# Patient Record
Sex: FEMALE | Race: WHITE | Employment: PART TIME | URBAN - METROPOLITAN AREA
[De-identification: names, ages, dates, MRNs, and addresses within clinical notes are randomized per-mention and may not be internally consistent; named-entity substitution may affect disease eponyms.]

---

## 2017-02-14 ENCOUNTER — COMMUNICATION - HEALTHEAST (OUTPATIENT)
Dept: CARDIOLOGY | Facility: HOSPITAL | Age: 47
End: 2017-02-14

## 2018-04-27 ENCOUNTER — RECORDS - HEALTHEAST (OUTPATIENT)
Dept: ADMINISTRATIVE | Facility: OTHER | Age: 48
End: 2018-04-27

## 2019-02-09 ENCOUNTER — OFFICE VISIT (OUTPATIENT)
Dept: URGENT CARE | Facility: URGENT CARE | Age: 49
End: 2019-02-09
Payer: MEDICAID

## 2019-02-09 VITALS
TEMPERATURE: 97.8 F | OXYGEN SATURATION: 94 % | HEART RATE: 93 BPM | SYSTOLIC BLOOD PRESSURE: 108 MMHG | WEIGHT: 242 LBS | RESPIRATION RATE: 20 BRPM | DIASTOLIC BLOOD PRESSURE: 60 MMHG

## 2019-02-09 DIAGNOSIS — J02.0 STREP PHARYNGITIS: Primary | ICD-10-CM

## 2019-02-09 DIAGNOSIS — J45.41 MODERATE PERSISTENT ASTHMA WITH EXACERBATION: ICD-10-CM

## 2019-02-09 LAB
DEPRECATED S PYO AG THROAT QL EIA: ABNORMAL
FLUAV+FLUBV AG SPEC QL: NEGATIVE
FLUAV+FLUBV AG SPEC QL: NEGATIVE
SPECIMEN SOURCE: ABNORMAL
SPECIMEN SOURCE: NORMAL

## 2019-02-09 PROCEDURE — 87880 STREP A ASSAY W/OPTIC: CPT | Performed by: FAMILY MEDICINE

## 2019-02-09 PROCEDURE — 99214 OFFICE O/P EST MOD 30 MIN: CPT | Performed by: FAMILY MEDICINE

## 2019-02-09 PROCEDURE — 87804 INFLUENZA ASSAY W/OPTIC: CPT | Performed by: FAMILY MEDICINE

## 2019-02-09 RX ORDER — AMLODIPINE BESYLATE 10 MG/1
10 TABLET ORAL
COMMUNITY
Start: 2018-02-07

## 2019-02-09 RX ORDER — PANTOPRAZOLE SODIUM 40 MG/1
40 TABLET, DELAYED RELEASE ORAL
COMMUNITY
Start: 2018-07-27

## 2019-02-09 RX ORDER — NYSTATIN 100000 U/G
CREAM TOPICAL
COMMUNITY
Start: 2017-12-13

## 2019-02-09 RX ORDER — IPRATROPIUM BROMIDE AND ALBUTEROL SULFATE 2.5; .5 MG/3ML; MG/3ML
3 SOLUTION RESPIRATORY (INHALATION)
COMMUNITY

## 2019-02-09 RX ORDER — PENICILLIN V POTASSIUM 500 MG/1
500 TABLET, FILM COATED ORAL 2 TIMES DAILY
Qty: 20 TABLET | Refills: 0 | Status: SHIPPED | OUTPATIENT
Start: 2019-02-09 | End: 2019-02-19

## 2019-02-09 RX ORDER — VENLAFAXINE HYDROCHLORIDE 75 MG/1
75 CAPSULE, EXTENDED RELEASE ORAL
COMMUNITY
Start: 2018-05-04

## 2019-02-09 RX ORDER — ALBUTEROL SULFATE 90 UG/1
1-2 AEROSOL, METERED RESPIRATORY (INHALATION)
COMMUNITY
Start: 2018-02-07

## 2019-02-09 RX ORDER — LISINOPRIL 20 MG/1
20 TABLET ORAL
COMMUNITY
Start: 2018-02-07

## 2019-02-09 RX ORDER — BUSPIRONE HYDROCHLORIDE 10 MG/1
10 TABLET ORAL
COMMUNITY
Start: 2018-02-07

## 2019-02-09 RX ORDER — PREDNISONE 20 MG/1
TABLET ORAL
Qty: 10 TABLET | Refills: 0 | Status: SHIPPED | OUTPATIENT
Start: 2019-02-09

## 2019-02-09 RX ORDER — IMIPRAMINE HYDROCHLORIDE 25 MG/1
TABLET ORAL
COMMUNITY
Start: 2015-01-18

## 2019-02-09 NOTE — PROGRESS NOTES
SUBJECTIVE:   Naomi Jeff is a 48 year old female who presents to clinic today for the following health issues:    HPI     Presents with boyfriend with hx of ST, cough, bodyaches since W.  Just got vaccinated against flu.  Roommate sick.  Wants to be checked for flu and strep.  Increased wheezing and harsh cough. + asthma.    Problem list and histories reviewed & adjusted, as indicated.  Additional history: as documented        There is no problem list on file for this patient.    No past surgical history on file.    Social History     Tobacco Use     Smoking status: Never Smoker     Smokeless tobacco: Never Used   Substance Use Topics     Alcohol use: Not on file     No family history on file.      Current Outpatient Medications   Medication Sig Dispense Refill     albuterol (VENTOLIN HFA) 108 (90 Base) MCG/ACT inhaler Inhale 1-2 puffs into the lungs       amLODIPine (NORVASC) 10 MG tablet Take 10 mg by mouth       busPIRone (BUSPAR) 10 MG tablet Take 10 mg by mouth       fluticasone (ARNUITY ELLIPTA) 100 MCG/ACT inhaler Inhale 1 puff into the lungs       levonorgestrel (MIRENA) 20 MCG/24HR IUD 1 Device by Intrauterine route       lisinopril (PRINIVIL/ZESTRIL) 20 MG tablet Take 20 mg by mouth       nebulizer (ADULT) device/mask kit Neb, 4 disposable neb kit, 1 reuseable neb kit, mask, filters x 1.   Frequency of use: daily as needed. Length of need: 99 months       nystatin (MYCOSTATIN) 354484 UNIT/GM external cream        pantoprazole (PROTONIX) 40 MG EC tablet Take 40 mg by mouth       penicillin V (VEETID) 500 MG tablet Take 1 tablet (500 mg) by mouth 2 times daily for 10 days 20 tablet 0     predniSONE (DELTASONE) 20 MG tablet 2 tabs once daily x 5 days. 10 tablet 0     ranitidine (ZANTAC) 300 MG tablet Take 1/2-1 tablet daily PRN       Spacer/Aero-Holding Chambers (AEROCHAMBER WITH MOUTHPIECE) MISC For home use.       venlafaxine (EFFEXOR-XR) 75 MG 24 hr capsule Take 75 mg by mouth       ipratropium -  albuterol 0.5 mg/2.5 mg/3 mL (DUONEB) 0.5-2.5 (3) MG/3ML neb solution 3 mLs       Allergies   Allergen Reactions     Hydrochlorothiazide Rash     No lab results found.   BP Readings from Last 3 Encounters:   02/09/19 108/60    Wt Readings from Last 3 Encounters:   02/09/19 109.8 kg (242 lb)         ROS:  Constitutional, HEENT, cardiovascular, pulmonary, gi and gu systems are negative, except as otherwise noted.    OBJECTIVE:     /60 (BP Location: Right arm)   Pulse 93   Temp 97.8  F (36.6  C) (Tympanic)   Resp 20   Wt 109.8 kg (242 lb)   SpO2 94%   There is no height or weight on file to calculate BMI.  GENERAL: healthy, alert and no distress  EYES: Eyes grossly normal to inspection, PERRL and conjunctivae and sclerae normal  HENT: ear canals and TM's normal, nose and mouth without ulcers or lesions, mild erythema no exudate of posterior pharynx.  NECK: no adenopathy, no asymmetry, masses, or scars and thyroid normal to palpation  RESP: lungs clear to auscultation - no rales or rhonchi, scattered wheezes throughout with harsh congested cough  CV: regular rate and rhythm, normal S1 S2, no S3 or S4, no murmur, click or rub, no peripheral edema and peripheral pulses strong  ABDOMEN: soft, nontender, no hepatosplenomegaly, no masses and bowel sounds normal  MS: no gross musculoskeletal defects noted, no edema  SKIN: no suspicious lesions or rashes  PSYCH: mentation appears normal, affect normal/bright    Diagnostic Test Results:  Results for orders placed or performed in visit on 02/09/19 (from the past 24 hour(s))   Strep, Rapid Screen   Result Value Ref Range    Specimen Description Throat     Rapid Strep A Screen (A)      POSITIVE: Group A Streptococcal antigen detected by immunoassay.   Influenza A/B antigen   Result Value Ref Range    Influenza A/B Agn Specimen Nasal     Influenza A Negative NEG^Negative    Influenza B Negative NEG^Negative       ASSESSMENT/PLAN:     1. Strep pharyngitis    - Strep,  Rapid Screen  - Influenza A/B antigen  - penicillin V (VEETID) 500 MG tablet; Take 1 tablet (500 mg) by mouth 2 times daily for 10 days  Dispense: 20 tablet; Refill: 0    Treat with PenVK bid x 10 days.  Continue with rest and fluids.    2. Moderate persistent asthma with exacerbation  - predniSONE (DELTASONE) 20 MG tablet; 2 tabs once daily x 5 days.  Dispense: 10 tablet; Refill: 0    Prednisone burst with continued inhaler regimen.  Close Follow-up if no change or worsening symptoms.    Katerina Zavaleta MD  [facility]

## 2019-02-15 ENCOUNTER — HEALTH MAINTENANCE LETTER (OUTPATIENT)
Age: 49
End: 2019-02-15

## 2020-03-11 ENCOUNTER — HEALTH MAINTENANCE LETTER (OUTPATIENT)
Age: 50
End: 2020-03-11

## 2021-01-03 ENCOUNTER — HEALTH MAINTENANCE LETTER (OUTPATIENT)
Age: 51
End: 2021-01-03

## 2021-04-25 ENCOUNTER — HEALTH MAINTENANCE LETTER (OUTPATIENT)
Age: 51
End: 2021-04-25

## 2021-05-28 ENCOUNTER — RECORDS - HEALTHEAST (OUTPATIENT)
Dept: ADMINISTRATIVE | Facility: CLINIC | Age: 51
End: 2021-05-28

## 2021-05-29 ENCOUNTER — RECORDS - HEALTHEAST (OUTPATIENT)
Dept: ADMINISTRATIVE | Facility: CLINIC | Age: 51
End: 2021-05-29

## 2021-10-10 ENCOUNTER — HEALTH MAINTENANCE LETTER (OUTPATIENT)
Age: 51
End: 2021-10-10

## 2022-01-29 ENCOUNTER — HEALTH MAINTENANCE LETTER (OUTPATIENT)
Age: 52
End: 2022-01-29

## 2022-03-23 ENCOUNTER — HOSPITAL ENCOUNTER (EMERGENCY)
Facility: CLINIC | Age: 52
Discharge: HOME | End: 2022-03-23
Admitting: PHYSICIAN ASSISTANT
Payer: COMMERCIAL

## 2022-03-23 VITALS
HEART RATE: 82 BPM | DIASTOLIC BLOOD PRESSURE: 104 MMHG | RESPIRATION RATE: 16 BRPM | OXYGEN SATURATION: 100 % | SYSTOLIC BLOOD PRESSURE: 169 MMHG | TEMPERATURE: 97.7 F | HEIGHT: 62 IN | BODY MASS INDEX: 38.09 KG/M2 | WEIGHT: 207 LBS

## 2022-03-23 DIAGNOSIS — S61.216A LACERATION OF RIGHT LITTLE FINGER WITHOUT FOREIGN BODY WITHOUT DAMAGE TO NAIL, INITIAL ENCOUNTER: ICD-10-CM

## 2022-03-23 PROCEDURE — 99283 EMERGENCY DEPT VISIT LOW MDM: CPT

## 2022-03-23 PROCEDURE — 272N000047 HC ADHESIVE DERMABOND SKIN

## 2022-03-23 PROCEDURE — 12001 RPR S/N/AX/GEN/TRNK 2.5CM/<: CPT

## 2022-03-23 NOTE — ED PROVIDER NOTES
EMERGENCY DEPARTMENT ENCOUNTER      NAME: Naomi Jeff  AGE: 51 year old female  YOB: 1970  MRN: [id number]  EVALUATION DATE & TIME: 3/23/2022  6:15 PM    PCP: Matthew Rivas [place]    ED PROVIDER: Venkat Willis PA-C      Chief Complaint   Patient presents with     Laceration         FINAL IMPRESSION:  1. Laceration of right little finger without foreign body without damage to nail, initial encounter          MEDICAL DECISION MAKING:    Pertinent Labs & Imaging studies reviewed. (See chart for details)  51 year old female presents to the Emergency Department for evaluation of fingertip laceration involving right fifth fingertip.    After obtaining history of present illness, reviewing vitals and examining the patient we did clean the wound and sealed up using Dermabond.  There was completely missing piece of evulsed tissue that was not able to be reattached.  The wound itself will remodel and heal well.  I informed the patient that she needs to keep the area clean and dry for at least a week, specifically not soaking the area in water.  Patient's last tetanus was in 2020 no need for update.  The skin glue will wear off on its own.      ED COURSE    I met with the patient, obtained history, performed an initial exam, and discussed options and plan for diagnostics and treatment here in the ED.    At the conclusion of the encounter I discussed the results of all of the tests and the disposition. The questions were answered. The patient or family acknowledged understanding and was agreeable with the care plan.     MEDICATIONS GIVEN IN THE EMERGENCY:  Medications - No data to display    NEW PRESCRIPTIONS STARTED AT TODAY'S ER VISIT  New Prescriptions    No medications on file            =================================================================    HPI    Patient information was obtained from:   Naomi Jeff is a 51 year old female who presents to this ED for evaluation of  right fifth fingertip laceration.  Patient reports that she was using a mandolin to cut zucchini.  While using this she sliced the tip of her right fifth fingertip off.  Difficulty with bleeding control.  No use of blood thinners.  No other injuries reported.  Last tetanus 2020.            PAST MEDICAL HISTORY:  No past medical history on file.    PAST SURGICAL HISTORY:  No past surgical history on file.      CURRENT MEDICATIONS:    No current facility-administered medications for this encounter.    Current Outpatient Medications:      albuterol (VENTOLIN HFA) 108 (90 Base) MCG/ACT inhaler, Inhale 1-2 puffs into the lungs, Disp: , Rfl:      amLODIPine (NORVASC) 10 MG tablet, Take 10 mg by mouth, Disp: , Rfl:      busPIRone (BUSPAR) 10 MG tablet, Take 10 mg by mouth, Disp: , Rfl:      fluticasone (ARNUITY ELLIPTA) 100 MCG/ACT inhaler, Inhale 1 puff into the lungs, Disp: , Rfl:      ipratropium - albuterol 0.5 mg/2.5 mg/3 mL (DUONEB) 0.5-2.5 (3) MG/3ML neb solution, 3 mLs, Disp: , Rfl:      levonorgestrel (MIRENA) 20 MCG/24HR IUD, 1 Device by Intrauterine route, Disp: , Rfl:      lisinopril (PRINIVIL/ZESTRIL) 20 MG tablet, Take 20 mg by mouth, Disp: , Rfl:      nebulizer (ADULT) device/mask kit, Neb, 4 disposable neb kit, 1 reuseable neb kit, mask, filters x 1.   Frequency of use: daily as needed. Length of need: 99 months, Disp: , Rfl:      nystatin (MYCOSTATIN) 004631 UNIT/GM external cream, , Disp: , Rfl:      pantoprazole (PROTONIX) 40 MG EC tablet, Take 40 mg by mouth, Disp: , Rfl:      predniSONE (DELTASONE) 20 MG tablet, 2 tabs once daily x 5 days., Disp: 10 tablet, Rfl: 0     ranitidine (ZANTAC) 300 MG tablet, Take 1/2-1 tablet daily PRN, Disp: , Rfl:      Spacer/Aero-Holding Chambers (AEROCHAMBER WITH MOUTHPIECE) MISC, For home use., Disp: , Rfl:      venlafaxine (EFFEXOR-XR) 75 MG 24 hr capsule, Take 75 mg by mouth, Disp: , Rfl:       ALLERGIES:  Allergies   Allergen Reactions     Hydrochlorothiazide Rash  "      FAMILY HISTORY:  No family history on file.    SOCIAL HISTORY:   Social History     Socioeconomic History     Marital status:      Spouse name: Not on file     Number of children: Not on file     Years of education: Not on file     Highest education level: Not on file   Occupational History     Not on file   Tobacco Use     Smoking status: Never Smoker     Smokeless tobacco: Never Used   Substance and Sexual Activity     Alcohol use: Not on file     Drug use: Not on file     Sexual activity: Not on file   Other Topics Concern     Not on file   Social History Narrative     Not on file     Social Determinants of Health     Financial Resource Strain: Not on file   Food Insecurity: Not on file   Transportation Needs: Not on file   Physical Activity: Not on file   Stress: Not on file   Social Connections: Not on file   Intimate Partner Violence: Not on file   Housing Stability: Not on file       VITALS:  Patient Vitals for the past 24 hrs:   BP Temp Pulse Resp SpO2 Height Weight   03/23/22 1757 (!) 169/104 97.7  F (36.5  C) 82 16 100 % 1.575 m (5' 2\") 93.9 kg (207 lb)       PHYSICAL EXAM    Physical Exam  Vitals and nursing note reviewed.   Constitutional:       General: She is not in acute distress.     Appearance: She is normal weight. She is not toxic-appearing.   HENT:      Head: Normocephalic.   Eyes:      Conjunctiva/sclera: Conjunctivae normal.   Pulmonary:      Effort: Pulmonary effort is normal. No respiratory distress.   Musculoskeletal:         General: No deformity or signs of injury. Normal range of motion.   Skin:     General: Skin is warm.      Comments: Avulsion type injury involving right fifth fingertip.  1 cm area of tissue completely removed.  Venous oozing and bleeding noted.  No nailbed involvement.   Neurological:      General: No focal deficit present.      Mental Status: She is alert. Mental status is at baseline.      Sensory: No sensory deficit.      Motor: No weakness. "   Psychiatric:         Mood and Affect: Mood normal.            LAB:  All pertinent labs reviewed and interpreted.       RADIOLOGY:  Reviewed all pertinent imaging. Please see official radiology report.  No orders to display     Procedure:  [facility]    -Laceration Repair    Date/Time: 3/23/2022 6:47 PM  Performed by: Venkat Willis PA-C  Authorized by: Venkat Willis PA-C     Risks, benefits and alternatives discussed.    LACERATION DETAILS     Location:  Finger    Finger location:  R small finger    Length (cm):  1    Depth (mm):  3    REPAIR TYPE:     Repair type:  Simple      EXPLORATION:     Hemostasis achieved with:  Direct pressure and tourniquet    Wound extent: no tendon damage and no underlying fracture      TREATMENT:     Area cleansed with:  Saline    Amount of cleaning:  Standard    SKIN REPAIR     Repair method:  Tissue adhesive    POST-PROCEDURE DETAILS     Dressing:  Adhesive bandage            Venkat Willis PA-C  Emergency Medicine  [facility]     Venkat Willis PA-C  03/23/22 4261

## 2022-05-21 ENCOUNTER — HEALTH MAINTENANCE LETTER (OUTPATIENT)
Age: 52
End: 2022-05-21

## 2022-09-18 ENCOUNTER — HEALTH MAINTENANCE LETTER (OUTPATIENT)
Age: 52
End: 2022-09-18

## 2023-01-29 ENCOUNTER — HEALTH MAINTENANCE LETTER (OUTPATIENT)
Age: 53
End: 2023-01-29

## 2023-05-07 ENCOUNTER — HEALTH MAINTENANCE LETTER (OUTPATIENT)
Age: 53
End: 2023-05-07

## 2024-02-25 ENCOUNTER — HEALTH MAINTENANCE LETTER (OUTPATIENT)
Age: 54
End: 2024-02-25

## 2025-02-23 ENCOUNTER — APPOINTMENT (OUTPATIENT)
Dept: CT IMAGING | Facility: CLINIC | Age: 55
End: 2025-02-23
Attending: EMERGENCY MEDICINE
Payer: COMMERCIAL

## 2025-02-23 ENCOUNTER — HOSPITAL ENCOUNTER (EMERGENCY)
Facility: CLINIC | Age: 55
Discharge: HOME | End: 2025-02-23
Attending: EMERGENCY MEDICINE | Admitting: EMERGENCY MEDICINE
Payer: COMMERCIAL

## 2025-02-23 VITALS
HEIGHT: 62 IN | HEART RATE: 84 BPM | BODY MASS INDEX: 33.49 KG/M2 | TEMPERATURE: 98.9 F | RESPIRATION RATE: 17 BRPM | WEIGHT: 182 LBS | DIASTOLIC BLOOD PRESSURE: 95 MMHG | OXYGEN SATURATION: 97 % | SYSTOLIC BLOOD PRESSURE: 170 MMHG

## 2025-02-23 DIAGNOSIS — N10 PYELONEPHRITIS, ACUTE: ICD-10-CM

## 2025-02-23 LAB
ALBUMIN SERPL BCG-MCNC: 4.2 G/DL (ref 3.5–5.2)
ALBUMIN UR-MCNC: 50 MG/DL
ALP SERPL-CCNC: 118 U/L (ref 40–150)
ALT SERPL W P-5'-P-CCNC: 26 U/L (ref 0–50)
ANION GAP SERPL CALCULATED.3IONS-SCNC: 13 MMOL/L (ref 7–15)
APPEARANCE UR: ABNORMAL
AST SERPL W P-5'-P-CCNC: 25 U/L (ref 0–45)
BACTERIA #/AREA URNS HPF: ABNORMAL /HPF
BASOPHILS # BLD AUTO: 0.1 10E3/UL (ref 0–0.2)
BASOPHILS NFR BLD AUTO: 0 %
BILIRUB SERPL-MCNC: 0.6 MG/DL
BILIRUB UR QL STRIP: NEGATIVE
BUN SERPL-MCNC: 5.7 MG/DL (ref 6–20)
CALCIUM SERPL-MCNC: 9.2 MG/DL (ref 8.8–10.4)
CHLORIDE SERPL-SCNC: 99 MMOL/L (ref 98–107)
COLOR UR AUTO: ABNORMAL
CREAT SERPL-MCNC: 0.68 MG/DL (ref 0.51–0.95)
CRP SERPL-MCNC: 25.5 MG/L
EGFRCR SERPLBLD CKD-EPI 2021: >90 ML/MIN/1.73M2
EOSINOPHIL # BLD AUTO: 0.1 10E3/UL (ref 0–0.7)
EOSINOPHIL NFR BLD AUTO: 1 %
ERYTHROCYTE [DISTWIDTH] IN BLOOD BY AUTOMATED COUNT: 12.9 % (ref 10–15)
GLUCOSE SERPL-MCNC: 102 MG/DL (ref 70–99)
GLUCOSE UR STRIP-MCNC: NEGATIVE MG/DL
HCO3 SERPL-SCNC: 24 MMOL/L (ref 22–29)
HCT VFR BLD AUTO: 38.6 % (ref 35–47)
HGB BLD-MCNC: 14 G/DL (ref 11.7–15.7)
HGB UR QL STRIP: ABNORMAL
IMM GRANULOCYTES # BLD: 0.1 10E3/UL
IMM GRANULOCYTES NFR BLD: 0 %
KETONES UR STRIP-MCNC: NEGATIVE MG/DL
LEUKOCYTE ESTERASE UR QL STRIP: ABNORMAL
LIPASE SERPL-CCNC: 37 U/L (ref 13–60)
LYMPHOCYTES # BLD AUTO: 1.1 10E3/UL (ref 0.8–5.3)
LYMPHOCYTES NFR BLD AUTO: 7 %
MCH RBC QN AUTO: 31.7 PG (ref 26.5–33)
MCHC RBC AUTO-ENTMCNC: 36.3 G/DL (ref 31.5–36.5)
MCV RBC AUTO: 88 FL (ref 78–100)
MONOCYTES # BLD AUTO: 1.2 10E3/UL (ref 0–1.3)
MONOCYTES NFR BLD AUTO: 8 %
MUCOUS THREADS #/AREA URNS LPF: PRESENT /LPF
NEUTROPHILS # BLD AUTO: 13.4 10E3/UL (ref 1.6–8.3)
NEUTROPHILS NFR BLD AUTO: 84 %
NITRATE UR QL: NEGATIVE
NRBC # BLD AUTO: 0 10E3/UL
NRBC BLD AUTO-RTO: 0 /100
PH UR STRIP: 6 [PH] (ref 5–7)
PLATELET # BLD AUTO: 411 10E3/UL (ref 150–450)
POTASSIUM SERPL-SCNC: 3.7 MMOL/L (ref 3.4–5.3)
PROT SERPL-MCNC: 7.4 G/DL (ref 6.4–8.3)
RADIOLOGIST FLAGS: NORMAL
RBC # BLD AUTO: 4.41 10E6/UL (ref 3.8–5.2)
RBC URINE: 2 /HPF
SODIUM SERPL-SCNC: 136 MMOL/L (ref 135–145)
SP GR UR STRIP: 1 (ref 1–1.03)
SQUAMOUS EPITHELIAL: 1 /HPF
UROBILINOGEN UR STRIP-MCNC: <2 MG/DL
WBC # BLD AUTO: 16 10E3/UL (ref 4–11)
WBC CLUMPS #/AREA URNS HPF: PRESENT /HPF
WBC URINE: >182 /HPF

## 2025-02-23 PROCEDURE — 99285 EMERGENCY DEPT VISIT HI MDM: CPT | Mod: 25

## 2025-02-23 PROCEDURE — 87186 SC STD MICRODIL/AGAR DIL: CPT | Performed by: EMERGENCY MEDICINE

## 2025-02-23 PROCEDURE — 250N000011 HC RX IP 250 OP 636: Performed by: EMERGENCY MEDICINE

## 2025-02-23 PROCEDURE — 85025 COMPLETE CBC W/AUTO DIFF WBC: CPT | Performed by: EMERGENCY MEDICINE

## 2025-02-23 PROCEDURE — 85041 AUTOMATED RBC COUNT: CPT | Performed by: EMERGENCY MEDICINE

## 2025-02-23 PROCEDURE — 74177 CT ABD & PELVIS W/CONTRAST: CPT

## 2025-02-23 PROCEDURE — 82040 ASSAY OF SERUM ALBUMIN: CPT | Performed by: EMERGENCY MEDICINE

## 2025-02-23 PROCEDURE — 258N000003 HC RX IP 258 OP 636: Performed by: EMERGENCY MEDICINE

## 2025-02-23 PROCEDURE — 36415 COLL VENOUS BLD VENIPUNCTURE: CPT | Performed by: EMERGENCY MEDICINE

## 2025-02-23 PROCEDURE — 86140 C-REACTIVE PROTEIN: CPT | Performed by: EMERGENCY MEDICINE

## 2025-02-23 PROCEDURE — 96365 THER/PROPH/DIAG IV INF INIT: CPT | Mod: 59

## 2025-02-23 PROCEDURE — 83690 ASSAY OF LIPASE: CPT | Performed by: EMERGENCY MEDICINE

## 2025-02-23 PROCEDURE — 81001 URINALYSIS AUTO W/SCOPE: CPT | Performed by: EMERGENCY MEDICINE

## 2025-02-23 PROCEDURE — 96375 TX/PRO/DX INJ NEW DRUG ADDON: CPT

## 2025-02-23 RX ORDER — CEFTRIAXONE 1 G/1
1 INJECTION, POWDER, FOR SOLUTION INTRAMUSCULAR; INTRAVENOUS ONCE
Status: COMPLETED | OUTPATIENT
Start: 2025-02-23 | End: 2025-02-23

## 2025-02-23 RX ORDER — KETOROLAC TROMETHAMINE 15 MG/ML
15 INJECTION, SOLUTION INTRAMUSCULAR; INTRAVENOUS ONCE
Status: COMPLETED | OUTPATIENT
Start: 2025-02-23 | End: 2025-02-23

## 2025-02-23 RX ORDER — CEPHALEXIN 500 MG/1
500 CAPSULE ORAL 3 TIMES DAILY
Qty: 30 CAPSULE | Refills: 0 | Status: SHIPPED | OUTPATIENT
Start: 2025-02-23 | End: 2025-03-05

## 2025-02-23 RX ORDER — IOPAMIDOL 755 MG/ML
90 INJECTION, SOLUTION INTRAVASCULAR ONCE
Status: COMPLETED | OUTPATIENT
Start: 2025-02-23 | End: 2025-02-23

## 2025-02-23 RX ADMIN — IOPAMIDOL 90 ML: 755 INJECTION, SOLUTION INTRAVENOUS at 20:02

## 2025-02-23 RX ADMIN — CEFTRIAXONE 1 G: 1 INJECTION, POWDER, FOR SOLUTION INTRAMUSCULAR; INTRAVENOUS at 21:29

## 2025-02-23 RX ADMIN — SODIUM CHLORIDE 1000 ML: 0.9 INJECTION, SOLUTION INTRAVENOUS at 21:29

## 2025-02-23 RX ADMIN — KETOROLAC TROMETHAMINE 15 MG: 15 INJECTION, SOLUTION INTRAMUSCULAR; INTRAVENOUS at 20:25

## 2025-02-23 ASSESSMENT — ACTIVITIES OF DAILY LIVING (ADL)
ADLS_ACUITY_SCORE: 41

## 2025-02-23 ASSESSMENT — COLUMBIA-SUICIDE SEVERITY RATING SCALE - C-SSRS
1. IN THE PAST MONTH, HAVE YOU WISHED YOU WERE DEAD OR WISHED YOU COULD GO TO SLEEP AND NOT WAKE UP?: NO
2. HAVE YOU ACTUALLY HAD ANY THOUGHTS OF KILLING YOURSELF IN THE PAST MONTH?: NO
6. HAVE YOU EVER DONE ANYTHING, STARTED TO DO ANYTHING, OR PREPARED TO DO ANYTHING TO END YOUR LIFE?: NO

## 2025-02-24 NOTE — ED PROVIDER NOTES
EMERGENCY DEPARTMENT ENCOUNTER       ED Course & Medical Decision Making     6:41 PM Met with patient for initial interview and exam.    I saw and examined the patient.  IV was established and she was given a liter of saline as well as some Toradol.  Diagnostics ordered.  Her urine is clearly infected and CT scan is consistent with pyelonephritis or ascending ureteral urinary tract infection.    She does not appear particularly ill or septic without multiple SIRS.  I do feel that she could manage this as an outpatient and I will give her 1 dose of Rocephin here and transition her to Keflex as an outpatient.    She states that she has had frequent urine infections for many years but never seen a urologist so I placed a referral to urology.  She will also need to follow-up with her regular physician.  If any of her symptoms worsen despite being on the medication she would return here      Medical Decision Making  Obtained supplemental history:Supplemental history obtained?: Documented in chart and Family Member/Significant Other  Reviewed external records: External records reviewed?: Documented in chart  Care impacted by chronic illness:Hypertension and Other: obesity, cholecystectomy, DNC, and IUD implant  Care significantly affected by social determinants of health:N/A  Did you consider but not order tests?: Work up considered but not performed and documented in chart, if applicable  Did you interpret images independently?: Independent interpretation of ECG and images noted in documentation, when applicable.  Consultation discussion with other provider:Did you involve another provider (consultant, , pharmacy, etc.)?: No  Discharge. I prescribed additional prescription strength medication(s) as charted. See documentation for any additional details.      MIPS: Not Applicable       Prior to making a final disposition on this patient the results of patient's tests and other diagnostic studies were discussed with the  patient. All questions were answered. Patient expressed understanding of the plan and was amenable to it.    Medications   cefTRIAXone (ROCEPHIN) 1 g vial to attach to  mL bag for ADULTS or NS 50 mL bag for PEDS (has no administration in time range)   sodium chloride 0.9% BOLUS 1,000 mL (has no administration in time range)   iopamidol (ISOVUE-370) solution 90 mL (90 mLs Intravenous $Given 2/23/25 2002)   ketorolac (TORADOL) injection 15 mg (15 mg Intravenous $Given 2/23/25 2025)       Final Impression     1. Pyelonephritis, acute            Chief Complaint     Chief Complaint   Patient presents with    Abdominal Pain    Back Pain    Flank Pain       PT reports bilateral abd pain that radiates to her back. She states when she attempts to urinate she feels the pain increases.          Triage Assessment (Adult)       Row Name 02/23/25 0198          Triage Assessment    Airway WDL WDL        Respiratory WDL    Respiratory WDL WDL        Skin Circulation/Temperature WDL    Skin Circulation/Temperature WDL WDL        Cardiac WDL    Cardiac WDL WDL        Peripheral/Neurovascular WDL    Peripheral Neurovascular WDL WDL        Cognitive/Neuro/Behavioral WDL    Cognitive/Neuro/Behavioral WDL WDL                         HPI       Naomi Jeff is a 54 year old female with a significant medical history of hypertension, obesity, cholecystectomy, DNC, and IUD implant who presents to the emergency department for abdominal pain, back pain, and flank pain. The patient had an onset of sharp lower abdominal pain that radiates to her bilateral lower back. Since the onset of the abdominal pain this morning, she has had loss of appetite, decreased thirst, and bilateral flank pain when urinating. She is also unable to empty completely. Denies fever and chest pain. She did take Azo this morning. Currently, the pain is more on the right side. Reports that she did not have pains last night, but did feel constipated. She had an  episode of constipation last week, but is unsure if her symptoms are associated. She endorses being on Wegovy for 1 year as well as additional ongoing medications such as oxybutynin, lisinopril, and Protonix.      I, Venkat Frances am serving as a scribe to document services personally performed by Asher Huntley M.D. based on my observation and the provider's statements to me. I, Asher Huntley M.D attest that Venkat Frances is acting in a scribe capacity, has observed my performance of the services and has documented them in accordance with my direction.    Past Medical History     No past medical history on file.  No past surgical history on file.  No family history on file.   Social History     Tobacco Use    Smoking status: Never    Smokeless tobacco: Never       Relevant past medical, surgical, family and social history as documented above, has been reviewed and discussed with patient. No changes or additions, unless otherwise noted in the HPI.    Current Medications     albuterol (VENTOLIN HFA) 108 (90 Base) MCG/ACT inhaler  amLODIPine (NORVASC) 10 MG tablet  busPIRone (BUSPAR) 10 MG tablet  cephALEXin (KEFLEX) 500 MG capsule  fluticasone (ARNUITY ELLIPTA) 100 MCG/ACT inhaler  ipratropium - albuterol 0.5 mg/2.5 mg/3 mL (DUONEB) 0.5-2.5 (3) MG/3ML neb solution  levonorgestrel (MIRENA) 20 MCG/24HR IUD  lisinopril (PRINIVIL/ZESTRIL) 20 MG tablet  nebulizer (ADULT) device/mask kit  nystatin (MYCOSTATIN) 463087 UNIT/GM external cream  pantoprazole (PROTONIX) 40 MG EC tablet  predniSONE (DELTASONE) 20 MG tablet  ranitidine (ZANTAC) 300 MG tablet  Spacer/Aero-Holding Chambers (AEROCHAMBER WITH MOUTHPIECE) MISC  venlafaxine (EFFEXOR-XR) 75 MG 24 hr capsule        Allergies     Allergies   Allergen Reactions    Hydrochlorothiazide Rash       Review of Systems     Review of Systems     Remainder of systems reviewed, unless noted in HPI all others negative.    Physical Exam     BP (!) 170/95   Pulse 84   Temp 98.9  F  "(37.2  C) (Oral)   Resp 17   Ht 1.575 m (5' 2\")   Wt 82.6 kg (182 lb)   SpO2 97%   BMI 33.29 kg/m      Physical Exam  Vitals and nursing note reviewed.   HENT:      Head: Normocephalic.      Nose: Nose normal.   Cardiovascular:      Rate and Rhythm: Normal rate.   Pulmonary:      Effort: Pulmonary effort is normal.   Abdominal:      Tenderness: There is no abdominal tenderness (No anterior tenderness on palpation). There is right CVA tenderness and left CVA tenderness. There is no guarding or rebound.   Neurological:      Mental Status: She is alert. Mental status is at baseline.   Psychiatric:         Mood and Affect: Mood normal.             Labs & Imaging         Labs Ordered and Resulted from Time of ED Arrival to Time of ED Departure   COMPREHENSIVE METABOLIC PANEL - Abnormal       Result Value    Sodium 136      Potassium 3.7      Carbon Dioxide (CO2) 24      Anion Gap 13      Urea Nitrogen 5.7 (*)     Creatinine 0.68      GFR Estimate >90      Calcium 9.2      Chloride 99      Glucose 102 (*)     Alkaline Phosphatase 118      AST 25      ALT 26      Protein Total 7.4      Albumin 4.2      Bilirubin Total 0.6     CRP INFLAMMATION - Abnormal    CRP Inflammation 25.50 (*)    CBC WITH PLATELETS AND DIFFERENTIAL - Abnormal    WBC Count 16.0 (*)     RBC Count 4.41      Hemoglobin 14.0      Hematocrit 38.6      MCV 88      MCH 31.7      MCHC 36.3      RDW 12.9      Platelet Count 411      % Neutrophils 84      % Lymphocytes 7      % Monocytes 8      % Eosinophils 1      % Basophils 0      % Immature Granulocytes 0      NRBCs per 100 WBC 0      Absolute Neutrophils 13.4 (*)     Absolute Lymphocytes 1.1      Absolute Monocytes 1.2      Absolute Eosinophils 0.1      Absolute Basophils 0.1      Absolute Immature Granulocytes 0.1      Absolute NRBCs 0.0     ROUTINE UA WITH MICROSCOPIC REFLEX TO CULTURE - Abnormal    Color Urine Dark Yellow (*)     Appearance Urine Turbid (*)     Glucose Urine Negative      Bilirubin " Urine Negative      Ketones Urine Negative      Specific Gravity Urine 1.004      Blood Urine 0.5 mg/dL (*)     pH Urine 6.0      Protein Albumin Urine 50 (*)     Urobilinogen Urine <2.0      Nitrite Urine Negative      Leukocyte Esterase Urine 500 Braydon/uL (*)     Bacteria Urine Many (*)     WBC Clumps Urine Present (*)     Mucus Urine Present (*)     RBC Urine 2      WBC Urine >182 (*)     Squamous Epithelials Urine 1     LIPASE - Normal    Lipase 37     URINE CULTURE         Results for orders placed or performed during the hospital encounter of 02/23/25   CT Abdomen Pelvis w Contrast   Result Value Ref Range    Radiologist flags Adrenal nodule     Impression    IMPRESSION:  1.  Trace bilateral hydroureter with wall thickening of the bilateral ureters, likely sequela of ascending infection. Correlate with urinalysis.  2.  Appropriately positioned intrauterine device.  3.  Low-attenuation nodularity of the left adrenal, possibly an underlying adenoma. Recommend nonemergent/outpatient CT or MRI adrenal protocol.    [Recommend Follow Up: Adrenal nodule]    This report will be copied to the [facility] to ensure a provider acknowledges the finding.     Comprehensive metabolic panel   Result Value Ref Range    Sodium 136 135 - 145 mmol/L    Potassium 3.7 3.4 - 5.3 mmol/L    Carbon Dioxide (CO2) 24 22 - 29 mmol/L    Anion Gap 13 7 - 15 mmol/L    Urea Nitrogen 5.7 (L) 6.0 - 20.0 mg/dL    Creatinine 0.68 0.51 - 0.95 mg/dL    GFR Estimate >90 >60 mL/min/1.73m2    Calcium 9.2 8.8 - 10.4 mg/dL    Chloride 99 98 - 107 mmol/L    Glucose 102 (H) 70 - 99 mg/dL    Alkaline Phosphatase 118 40 - 150 U/L    AST 25 0 - 45 U/L    ALT 26 0 - 50 U/L    Protein Total 7.4 6.4 - 8.3 g/dL    Albumin 4.2 3.5 - 5.2 g/dL    Bilirubin Total 0.6 <=1.2 mg/dL   Result Value Ref Range    Lipase 37 13 - 60 U/L   Result Value Ref Range    CRP Inflammation 25.50 (H) <5.00 mg/L   CBC with platelets and differential   Result Value Ref Range     WBC Count 16.0 (H) 4.0 - 11.0 10e3/uL    RBC Count 4.41 3.80 - 5.20 10e6/uL    Hemoglobin 14.0 11.7 - 15.7 g/dL    Hematocrit 38.6 35.0 - 47.0 %    MCV 88 78 - 100 fL    MCH 31.7 26.5 - 33.0 pg    MCHC 36.3 31.5 - 36.5 g/dL    RDW 12.9 10.0 - 15.0 %    Platelet Count 411 150 - 450 10e3/uL    % Neutrophils 84 %    % Lymphocytes 7 %    % Monocytes 8 %    % Eosinophils 1 %    % Basophils 0 %    % Immature Granulocytes 0 %    NRBCs per 100 WBC 0 <1 /100    Absolute Neutrophils 13.4 (H) 1.6 - 8.3 10e3/uL    Absolute Lymphocytes 1.1 0.8 - 5.3 10e3/uL    Absolute Monocytes 1.2 0.0 - 1.3 10e3/uL    Absolute Eosinophils 0.1 0.0 - 0.7 10e3/uL    Absolute Basophils 0.1 0.0 - 0.2 10e3/uL    Absolute Immature Granulocytes 0.1 <=0.4 10e3/uL    Absolute NRBCs 0.0 10e3/uL   UA with Microscopic reflex to Culture    Specimen: Urine, Clean Catch   Result Value Ref Range    Color Urine Dark Yellow (A) Colorless, Straw, Light Yellow, Yellow    Appearance Urine Turbid (A) Clear    Glucose Urine Negative Negative mg/dL    Bilirubin Urine Negative Negative    Ketones Urine Negative Negative mg/dL    Specific Gravity Urine 1.004 1.001 - 1.030    Blood Urine 0.5 mg/dL (A) Negative    pH Urine 6.0 5.0 - 7.0    Protein Albumin Urine 50 (A) Negative mg/dL    Urobilinogen Urine <2.0 <2.0 mg/dL    Nitrite Urine Negative Negative    Leukocyte Esterase Urine 500 Braydon/uL (A) Negative    Bacteria Urine Many (A) None Seen /HPF    WBC Clumps Urine Present (A) None Seen /HPF    Mucus Urine Present (A) None Seen /LPF    RBC Urine 2 <=2 /HPF    WBC Urine >182 (H) <=5 /HPF    Squamous Epithelials Urine 1 <=1 /HPF       Asher Huntley MD  Emergency Medicine  [facility] EMERGENCY ROOM  [address] [address] [phone]  2/23/2025         Asher Huntley MD  02/23/25 0016

## 2025-02-24 NOTE — ED TRIAGE NOTES
PT reports bilateral abd pain that radiates to her back. She states when she attempts to urinate she feels the pain increases.          Triage Assessment (Adult)       Row Name 02/23/25 2952          Triage Assessment    Airway WDL WDL        Respiratory WDL    Respiratory WDL WDL        Skin Circulation/Temperature WDL    Skin Circulation/Temperature WDL WDL        Cardiac WDL    Cardiac WDL WDL        Peripheral/Neurovascular WDL    Peripheral Neurovascular WDL WDL        Cognitive/Neuro/Behavioral WDL    Cognitive/Neuro/Behavioral WDL WDL

## 2025-02-25 ENCOUNTER — TELEPHONE (OUTPATIENT)
Dept: NURSING | Facility: CLINIC | Age: 55
End: 2025-02-25
Payer: COMMERCIAL

## 2025-02-25 LAB — BACTERIA UR CULT: ABNORMAL

## 2025-02-25 NOTE — TELEPHONE ENCOUNTER
[facility]     Reason for call: Lab Result Notification     Lab Result (including Rx patient on, if applicable).  If culture, copy of lab report at bottom.  Lab Result: Urine Culture - See Below    ED Rx: cephALEXin (KEFLEX) 500 MG capsule - Take 1 capsule (500 mg) by mouth 3 times daily for 10 days (SUSCEPTIBLE)    Creatinine Level (mg/dl)   Creatinine   Date Value Ref Range Status   02/23/2025 0.68 0.51 - 0.95 mg/dL Final    Creatinine clearance (ml/min), if applicable    Serum creatinine: 0.68 mg/dL 02/23/25 1921  Estimated creatinine clearance: 94.2 mL/min     ED Symptoms: Presented to the ED with abdominal pain, back pain, flank pain, and urinary retention.     Current Symptoms: Unable to assess.     2/25/2025 @ 13:25: Patient calling back. She states that she is not feeling much better however, she just picked up the antibiotics yesterday.  She states her flank pain and tenderness is gone. She is nauseated but is able to push fluids. Denies fevers. She has a follow up with her PCP on 2/27/2025 at [facility].  Care advice given per the urine culture protocol. Patient verbalized understanding.     RN Recommendations/Instructions per [place] ED lab result protocol:   [facility] ED lab result protocol utilized: Urine Culture  Continue antibiotic/medication as prescribed: Cephalexin    Unable to reach patient/caregiver.     Left voicemail message requesting a call back to [phone] between 9 a.m. and 5:30 p.m. for patient's ED/ lab results.    Letter pended to be sent via [employer].       MANJINDER ROLLE RN

## 2025-02-25 NOTE — LETTER
February 25, 2025        Naomi Jeff  [address]  [facility] [place] [address]          Dear Naomi Jeff:    You were seen in the [facility] Emergency Department at [facility]  on 2/23/2025.  We are unable to reach you by phone, so we are sending you this letter.     It is important that you call [facility] Emergency Department lab result nurse at [phone], as we have information to relay to you AND/OR we MAY have to make some changes in your treatment.    Best time to call back is between 9AM and 5:30PM, 7 days a week.      Sincerely,     [facility] Emergency Department Lab Result RN  [phone]

## 2025-04-05 ENCOUNTER — HEALTH MAINTENANCE LETTER (OUTPATIENT)
Age: 55
End: 2025-04-05